# Patient Record
Sex: MALE | Race: WHITE | NOT HISPANIC OR LATINO | Employment: FULL TIME | URBAN - NONMETROPOLITAN AREA
[De-identification: names, ages, dates, MRNs, and addresses within clinical notes are randomized per-mention and may not be internally consistent; named-entity substitution may affect disease eponyms.]

---

## 2017-06-18 ENCOUNTER — HOSPITAL ENCOUNTER (OUTPATIENT)
Facility: HOSPITAL | Age: 67
Setting detail: OBSERVATION
Discharge: HOME | End: 2017-06-19
Attending: EMERGENCY MEDICINE | Admitting: FAMILY MEDICINE

## 2017-06-18 ENCOUNTER — APPOINTMENT (OUTPATIENT)
Dept: GENERAL RADIOLOGY | Facility: HOSPITAL | Age: 67
End: 2017-06-18

## 2017-06-18 DIAGNOSIS — I20.0 UNSTABLE ANGINA PECTORIS (HCC): Primary | ICD-10-CM

## 2017-06-18 PROBLEM — I20.9 ANGINA PECTORIS (HCC): Status: ACTIVE | Noted: 2017-06-18

## 2017-06-18 PROBLEM — G58.8: Status: ACTIVE | Noted: 2017-06-18

## 2017-06-18 PROBLEM — K50.90 CROHN'S DISEASE (HCC): Status: ACTIVE | Noted: 2017-06-18

## 2017-06-18 PROBLEM — R07.2 PRECORDIAL PAIN: Status: ACTIVE | Noted: 2017-06-18

## 2017-06-18 PROBLEM — I10 HYPERTENSION: Status: ACTIVE | Noted: 2017-06-18

## 2017-06-18 PROBLEM — E78.5 HYPERLIPIDEMIA: Status: ACTIVE | Noted: 2017-06-18

## 2017-06-18 LAB
ALBUMIN SERPL-MCNC: 4.5 G/DL (ref 3.5–5)
ALBUMIN/GLOB SERPL: 1.4 G/DL (ref 1.1–2.5)
ALP SERPL-CCNC: 94 U/L (ref 24–120)
ALT SERPL W P-5'-P-CCNC: 55 U/L (ref 0–54)
ANION GAP SERPL CALCULATED.3IONS-SCNC: 11 MMOL/L (ref 4–13)
AST SERPL-CCNC: 41 U/L (ref 7–45)
BASOPHILS # BLD AUTO: 0.05 10*3/MM3 (ref 0–0.2)
BASOPHILS NFR BLD AUTO: 0.5 % (ref 0–2)
BILIRUB SERPL-MCNC: 0.8 MG/DL (ref 0.1–1)
BUN BLD-MCNC: 13 MG/DL (ref 5–21)
BUN/CREAT SERPL: 12 (ref 7–25)
CALCIUM SPEC-SCNC: 9.6 MG/DL (ref 8.4–10.4)
CHLORIDE SERPL-SCNC: 103 MMOL/L (ref 98–110)
CO2 SERPL-SCNC: 28 MMOL/L (ref 24–31)
CREAT BLD-MCNC: 1.08 MG/DL (ref 0.5–1.4)
DEPRECATED RDW RBC AUTO: 45.4 FL (ref 40–54)
EOSINOPHIL # BLD AUTO: 0.47 10*3/MM3 (ref 0–0.7)
EOSINOPHIL NFR BLD AUTO: 5 % (ref 0–4)
ERYTHROCYTE [DISTWIDTH] IN BLOOD BY AUTOMATED COUNT: 13.4 % (ref 12–15)
GFR SERPL CREATININE-BSD FRML MDRD: 70 ML/MIN/1.73
GLOBULIN UR ELPH-MCNC: 3.3 GM/DL
GLUCOSE BLD-MCNC: 116 MG/DL (ref 70–100)
HCT VFR BLD AUTO: 43.3 % (ref 40–52)
HGB BLD-MCNC: 14.8 G/DL (ref 14–18)
HOLD SPECIMEN: NORMAL
HOLD SPECIMEN: NORMAL
IMM GRANULOCYTES # BLD: 0.02 10*3/MM3 (ref 0–0.03)
IMM GRANULOCYTES NFR BLD: 0.2 % (ref 0–5)
INR PPP: 0.93 (ref 0.91–1.09)
LYMPHOCYTES # BLD AUTO: 2.07 10*3/MM3 (ref 0.72–4.86)
LYMPHOCYTES NFR BLD AUTO: 21.9 % (ref 15–45)
MCH RBC QN AUTO: 31.8 PG (ref 28–32)
MCHC RBC AUTO-ENTMCNC: 34.2 G/DL (ref 33–36)
MCV RBC AUTO: 92.9 FL (ref 82–95)
MONOCYTES # BLD AUTO: 1 10*3/MM3 (ref 0.19–1.3)
MONOCYTES NFR BLD AUTO: 10.6 % (ref 4–12)
NEUTROPHILS # BLD AUTO: 5.84 10*3/MM3 (ref 1.87–8.4)
NEUTROPHILS NFR BLD AUTO: 61.8 % (ref 39–78)
PLATELET # BLD AUTO: 283 10*3/MM3 (ref 130–400)
PMV BLD AUTO: 10.8 FL (ref 6–12)
POTASSIUM BLD-SCNC: 3.9 MMOL/L (ref 3.5–5.3)
PROT SERPL-MCNC: 7.8 G/DL (ref 6.3–8.7)
PROTHROMBIN TIME: 12.7 SECONDS (ref 11.9–14.6)
RBC # BLD AUTO: 4.66 10*6/MM3 (ref 4.8–5.9)
SODIUM BLD-SCNC: 142 MMOL/L (ref 135–145)
TROPONIN I SERPL-MCNC: 0 NG/ML (ref 0–0.07)
TROPONIN I SERPL-MCNC: <0.012 NG/ML (ref 0–0.03)
TROPONIN I SERPL-MCNC: <0.012 NG/ML (ref 0–0.03)
WBC NRBC COR # BLD: 9.45 10*3/MM3 (ref 4.8–10.8)
WHOLE BLOOD HOLD SPECIMEN: NORMAL
WHOLE BLOOD HOLD SPECIMEN: NORMAL

## 2017-06-18 PROCEDURE — 80053 COMPREHEN METABOLIC PANEL: CPT | Performed by: EMERGENCY MEDICINE

## 2017-06-18 PROCEDURE — G0378 HOSPITAL OBSERVATION PER HR: HCPCS

## 2017-06-18 PROCEDURE — 85610 PROTHROMBIN TIME: CPT | Performed by: EMERGENCY MEDICINE

## 2017-06-18 PROCEDURE — 85025 COMPLETE CBC W/AUTO DIFF WBC: CPT | Performed by: EMERGENCY MEDICINE

## 2017-06-18 PROCEDURE — 99285 EMERGENCY DEPT VISIT HI MDM: CPT

## 2017-06-18 PROCEDURE — 84484 ASSAY OF TROPONIN QUANT: CPT | Performed by: FAMILY MEDICINE

## 2017-06-18 PROCEDURE — 93010 ELECTROCARDIOGRAM REPORT: CPT | Performed by: INTERNAL MEDICINE

## 2017-06-18 PROCEDURE — 71010 HC CHEST PA OR AP: CPT

## 2017-06-18 PROCEDURE — 93005 ELECTROCARDIOGRAM TRACING: CPT

## 2017-06-18 PROCEDURE — 84484 ASSAY OF TROPONIN QUANT: CPT

## 2017-06-18 RX ORDER — ASCORBIC ACID 500 MG
500 TABLET ORAL DAILY
Status: DISCONTINUED | OUTPATIENT
Start: 2017-06-18 | End: 2017-06-19

## 2017-06-18 RX ORDER — MELATONIN
1000 2 TIMES DAILY
COMMUNITY

## 2017-06-18 RX ORDER — MESALAMINE 400 MG/1
800 CAPSULE, DELAYED RELEASE ORAL EVERY 12 HOURS SCHEDULED
Status: DISCONTINUED | OUTPATIENT
Start: 2017-06-18 | End: 2017-06-19

## 2017-06-18 RX ORDER — SODIUM CHLORIDE 0.9 % (FLUSH) 0.9 %
10 SYRINGE (ML) INJECTION AS NEEDED
Status: DISCONTINUED | OUTPATIENT
Start: 2017-06-18 | End: 2017-06-19 | Stop reason: HOSPADM

## 2017-06-18 RX ORDER — MESALAMINE 400 MG/1
800 CAPSULE, DELAYED RELEASE ORAL EVERY 12 HOURS
COMMUNITY

## 2017-06-18 RX ORDER — ASPIRIN 81 MG/1
81 TABLET ORAL DAILY
Status: DISCONTINUED | OUTPATIENT
Start: 2017-06-19 | End: 2017-06-19 | Stop reason: HOSPADM

## 2017-06-18 RX ORDER — ACETAMINOPHEN 325 MG/1
650 TABLET ORAL EVERY 4 HOURS PRN
Status: DISCONTINUED | OUTPATIENT
Start: 2017-06-18 | End: 2017-06-19 | Stop reason: HOSPADM

## 2017-06-18 RX ORDER — ALPRAZOLAM 0.5 MG/1
1 TABLET ORAL 3 TIMES DAILY PRN
Status: DISCONTINUED | OUTPATIENT
Start: 2017-06-18 | End: 2017-06-19 | Stop reason: HOSPADM

## 2017-06-18 RX ORDER — MELATONIN
2000 DAILY
Status: DISCONTINUED | OUTPATIENT
Start: 2017-06-19 | End: 2017-06-19 | Stop reason: HOSPADM

## 2017-06-18 RX ORDER — HYDROCODONE BITARTRATE AND ACETAMINOPHEN 5; 325 MG/1; MG/1
1 TABLET ORAL EVERY 4 HOURS PRN
Status: DISCONTINUED | OUTPATIENT
Start: 2017-06-18 | End: 2017-06-19 | Stop reason: HOSPADM

## 2017-06-18 RX ORDER — SODIUM CHLORIDE 0.9 % (FLUSH) 0.9 %
1-10 SYRINGE (ML) INJECTION AS NEEDED
Status: DISCONTINUED | OUTPATIENT
Start: 2017-06-18 | End: 2017-06-19 | Stop reason: HOSPADM

## 2017-06-18 RX ORDER — BISACODYL 5 MG/1
5 TABLET, DELAYED RELEASE ORAL DAILY PRN
Status: DISCONTINUED | OUTPATIENT
Start: 2017-06-18 | End: 2017-06-19 | Stop reason: HOSPADM

## 2017-06-18 RX ORDER — METOPROLOL SUCCINATE 50 MG/1
50 TABLET, EXTENDED RELEASE ORAL DAILY
COMMUNITY

## 2017-06-18 RX ORDER — ATORVASTATIN CALCIUM 40 MG/1
40 TABLET, FILM COATED ORAL NIGHTLY
COMMUNITY

## 2017-06-18 RX ORDER — PANTOPRAZOLE SODIUM 40 MG/1
40 TABLET, DELAYED RELEASE ORAL EVERY MORNING
Status: DISCONTINUED | OUTPATIENT
Start: 2017-06-19 | End: 2017-06-19 | Stop reason: HOSPADM

## 2017-06-18 RX ORDER — MAGNESIUM HYDROXIDE/ALUMINUM HYDROXICE/SIMETHICONE 120; 1200; 1200 MG/30ML; MG/30ML; MG/30ML
30 SUSPENSION ORAL EVERY 6 HOURS PRN
Status: DISCONTINUED | OUTPATIENT
Start: 2017-06-18 | End: 2017-06-19 | Stop reason: HOSPADM

## 2017-06-18 RX ORDER — ASPIRIN 81 MG/1
324 TABLET, CHEWABLE ORAL ONCE
Status: COMPLETED | OUTPATIENT
Start: 2017-06-18 | End: 2017-06-18

## 2017-06-18 RX ORDER — ASCORBIC ACID 500 MG
500 TABLET ORAL NIGHTLY
COMMUNITY

## 2017-06-18 RX ORDER — ONDANSETRON 2 MG/ML
4 INJECTION INTRAMUSCULAR; INTRAVENOUS EVERY 6 HOURS PRN
Status: DISCONTINUED | OUTPATIENT
Start: 2017-06-18 | End: 2017-06-19 | Stop reason: HOSPADM

## 2017-06-18 RX ORDER — ATORVASTATIN CALCIUM 40 MG/1
40 TABLET, FILM COATED ORAL NIGHTLY
Status: DISCONTINUED | OUTPATIENT
Start: 2017-06-18 | End: 2017-06-19 | Stop reason: HOSPADM

## 2017-06-18 RX ORDER — ASPIRIN 81 MG/1
81 TABLET ORAL DAILY
COMMUNITY

## 2017-06-18 RX ORDER — ALPRAZOLAM 1 MG/1
1 TABLET ORAL 3 TIMES DAILY PRN
COMMUNITY

## 2017-06-18 RX ORDER — METOPROLOL SUCCINATE 50 MG/1
50 TABLET, EXTENDED RELEASE ORAL DAILY
Status: DISCONTINUED | OUTPATIENT
Start: 2017-06-19 | End: 2017-06-19 | Stop reason: HOSPADM

## 2017-06-18 RX ORDER — LANSOPRAZOLE 30 MG/1
30 CAPSULE, DELAYED RELEASE ORAL DAILY
COMMUNITY

## 2017-06-18 RX ADMIN — ASPIRIN 81 MG 243 MG: 81 TABLET ORAL at 17:18

## 2017-06-18 RX ADMIN — MESALAMINE 800 MG: 400 CAPSULE, DELAYED RELEASE ORAL at 20:55

## 2017-06-18 RX ADMIN — TICAGRELOR 90 MG: 90 TABLET ORAL at 20:55

## 2017-06-18 RX ADMIN — DESMOPRESSIN ACETATE 40 MG: 0.2 TABLET ORAL at 20:55

## 2017-06-18 RX ADMIN — OXYCODONE HYDROCHLORIDE AND ACETAMINOPHEN 500 MG: 500 TABLET ORAL at 20:55

## 2017-06-18 NOTE — ED PROVIDER NOTES
Subjective   Patient is a 60 y.o. male presenting with chest pain.   Chest Pain   Pain location:  Substernal area  Pain quality: aching and pressure    Pain radiates to:  L jaw  Pain severity:  Mild  Onset quality:  Gradual  Timing:  Constant  Progression:  Worsening  Chronicity:  New  Context: not breathing, not drug use, not eating, not lifting, not movement, not raising an arm, not at rest and not trauma    Relieved by:  Nothing  Worsened by:  Nothing  Associated symptoms: no abdominal pain, no AICD problem, no altered mental status, no back pain, no dizziness, no dysphagia, no fatigue, no headache, no nausea, no numbness, no orthopnea, no palpitations and no weakness        Review of Systems   Constitutional: Negative.  Negative for fatigue.   HENT: Negative.  Negative for trouble swallowing.    Cardiovascular: Positive for chest pain. Negative for palpitations and orthopnea.   Gastrointestinal: Negative.  Negative for abdominal distention, abdominal pain and nausea.   Endocrine: Negative.    Genitourinary: Negative.    Musculoskeletal: Negative.  Negative for back pain and neck pain.   Skin: Negative for color change and pallor.   Neurological: Negative.  Negative for dizziness, syncope, weakness, light-headedness, numbness and headaches.   Hematological: Negative.  Does not bruise/bleed easily.   All other systems reviewed and are negative.      Past Medical History:   Diagnosis Date   • Crohn's disease    • Disorder of phrenic nerve    • Hyperlipidemia    • Hypertension    • Myocardial infarction    • Panic attack        Allergies   Allergen Reactions   • Amoxicillin    • Ciprofloxacin    • Cozaar [Losartan Potassium]    • Levaquin [Levofloxacin]    • Penicillins        Past Surgical History:   Procedure Laterality Date   • ADENOIDECTOMY     • CORONARY ANGIOPLASTY WITH STENT PLACEMENT     • HERNIA REPAIR     • TONSILLECTOMY         History reviewed. No pertinent family history.    Social History     Social  History   • Marital status:      Spouse name: N/A   • Number of children: N/A   • Years of education: N/A     Social History Main Topics   • Smoking status: Never Smoker   • Smokeless tobacco: None   • Alcohol use No   • Drug use: No   • Sexual activity: Not Asked     Other Topics Concern   • None     Social History Narrative   • None           Objective   Physical Exam   Constitutional: He is oriented to person, place, and time. He appears well-developed.  Non-toxic appearance.   HENT:   Head: Normocephalic and atraumatic.   Mouth/Throat: Uvula is midline and mucous membranes are normal.   Eyes: Conjunctivae and lids are normal. Pupils are equal, round, and reactive to light. Lids are everted and swept, no foreign bodies found.   Neck: Trachea normal, normal range of motion, full passive range of motion without pain and phonation normal. Neck supple. Normal carotid pulses and no JVD present. Carotid bruit is not present. No rigidity. No tracheal deviation present.   Cardiovascular: Normal rate, regular rhythm, normal heart sounds, intact distal pulses and normal pulses.  PMI is not displaced.    Pulmonary/Chest: Effort normal and breath sounds normal. No accessory muscle usage or stridor. No apnea and no tachypnea. He has no decreased breath sounds. He has no wheezes. He has no rhonchi. He has no rales.   Abdominal: Soft. Normal appearance, normal aorta and bowel sounds are normal. There is no hepatosplenomegaly. There is no tenderness.   Musculoskeletal: Normal range of motion.   Lower extremity exam bilaterally is unremarkable.  There is no right or left calf tenderness .  There is no palpable venous cord.  No obvious difference in the size of the legs.  No pitting edema.  The dorsalis pedis and posterior tibial femoral and popliteal pulses are palpable and +2 bilaterally.  Homans sign is negative       Vascular Status -  His exam exhibits right foot vasculature normal. His exam exhibits no right foot  edema. His exam exhibits left foot vasculature normal. His exam exhibits no left foot edema.  Neurological: He is alert and oriented to person, place, and time. He has normal strength and normal reflexes. He displays normal reflexes. No cranial nerve deficit or sensory deficit. Gait normal. GCS eye subscore is 4. GCS verbal subscore is 5. GCS motor subscore is 6.   Skin: Skin is warm, dry and intact. No cyanosis. No pallor. Nails show no clubbing.   Psychiatric: He has a normal mood and affect. His speech is normal and behavior is normal.   Nursing note and vitals reviewed.      Procedures         ED Course  ED Course   Comment By Time   Patient given a chest pain EKG shows normal sinus rhythm; negative will be admitted to the hospitalist service for rule out protocol patient and the patient's family wants stay here Broderick Tinajero MD 06/18 5770            HEART Score  History: Moderately suspicious (+1)  ECG: Non specific repolarization disturbance (+1)  Age: 45 through 65 (+1)  Risk Factors: 1 - 2 risk factors (+1)  Troponin: Normal limit or lower (+0)  Total: 4         MDM    Final diagnoses:   Unstable angina pectoris            Broderick Tinajero MD  06/18/17 2045

## 2017-06-19 ENCOUNTER — APPOINTMENT (OUTPATIENT)
Dept: CARDIOLOGY | Facility: HOSPITAL | Age: 67
End: 2017-06-19
Attending: FAMILY MEDICINE

## 2017-06-19 VITALS
OXYGEN SATURATION: 98 % | DIASTOLIC BLOOD PRESSURE: 85 MMHG | RESPIRATION RATE: 18 BRPM | HEART RATE: 59 BPM | HEIGHT: 70 IN | BODY MASS INDEX: 34.3 KG/M2 | WEIGHT: 239.6 LBS | SYSTOLIC BLOOD PRESSURE: 127 MMHG | TEMPERATURE: 97.4 F

## 2017-06-19 LAB
ANION GAP SERPL CALCULATED.3IONS-SCNC: 11 MMOL/L (ref 4–13)
ARTICHOKE IGE QN: 45 MG/DL (ref 0–99)
BASOPHILS # BLD AUTO: 0.05 10*3/MM3 (ref 0–0.2)
BASOPHILS NFR BLD AUTO: 0.5 % (ref 0–2)
BH CV STRESS BP STAGE 1: NORMAL
BH CV STRESS BP STAGE 2: NORMAL
BH CV STRESS DURATION MIN STAGE 1: 3
BH CV STRESS DURATION MIN STAGE 2: 3
BH CV STRESS DURATION MIN STAGE 3: 0
BH CV STRESS DURATION SEC STAGE 1: 0
BH CV STRESS DURATION SEC STAGE 2: 0
BH CV STRESS DURATION SEC STAGE 3: 25
BH CV STRESS GRADE STAGE 1: 10
BH CV STRESS GRADE STAGE 2: 12
BH CV STRESS GRADE STAGE 3: 14
BH CV STRESS HR STAGE 1: 99
BH CV STRESS HR STAGE 2: 115
BH CV STRESS HR STAGE 3: 130
BH CV STRESS METS STAGE 1: 5
BH CV STRESS METS STAGE 2: 7.5
BH CV STRESS METS STAGE 3: 10
BH CV STRESS PROTOCOL 1: NORMAL
BH CV STRESS RECOVERY BP: NORMAL MMHG
BH CV STRESS RECOVERY HR: 76 BPM
BH CV STRESS SPEED STAGE 1: 1.7
BH CV STRESS SPEED STAGE 2: 2.5
BH CV STRESS SPEED STAGE 3: 3.4
BH CV STRESS STAGE 1: 1
BH CV STRESS STAGE 2: 2
BH CV STRESS STAGE 3: 3
BUN BLD-MCNC: 12 MG/DL (ref 5–21)
BUN/CREAT SERPL: 12.5 (ref 7–25)
CALCIUM SPEC-SCNC: 9.3 MG/DL (ref 8.4–10.4)
CHLORIDE SERPL-SCNC: 105 MMOL/L (ref 98–110)
CHOLEST SERPL-MCNC: 101 MG/DL (ref 130–200)
CO2 SERPL-SCNC: 27 MMOL/L (ref 24–31)
CREAT BLD-MCNC: 0.96 MG/DL (ref 0.5–1.4)
DEPRECATED RDW RBC AUTO: 45 FL (ref 40–54)
EOSINOPHIL # BLD AUTO: 0.54 10*3/MM3 (ref 0–0.7)
EOSINOPHIL NFR BLD AUTO: 5.4 % (ref 0–4)
ERYTHROCYTE [DISTWIDTH] IN BLOOD BY AUTOMATED COUNT: 13.3 % (ref 12–15)
GFR SERPL CREATININE-BSD FRML MDRD: 79 ML/MIN/1.73
GLUCOSE BLD-MCNC: 94 MG/DL (ref 70–100)
HCT VFR BLD AUTO: 41.7 % (ref 40–52)
HDLC SERPL-MCNC: 32 MG/DL
HGB BLD-MCNC: 14 G/DL (ref 14–18)
IMM GRANULOCYTES # BLD: 0.03 10*3/MM3 (ref 0–0.03)
IMM GRANULOCYTES NFR BLD: 0.3 % (ref 0–5)
LDLC/HDLC SERPL: 1.66 {RATIO}
LYMPHOCYTES # BLD AUTO: 2.6 10*3/MM3 (ref 0.72–4.86)
LYMPHOCYTES NFR BLD AUTO: 25.9 % (ref 15–45)
MAXIMAL PREDICTED HEART RATE: 156 BPM
MCH RBC QN AUTO: 31 PG (ref 28–32)
MCHC RBC AUTO-ENTMCNC: 33.6 G/DL (ref 33–36)
MCV RBC AUTO: 92.5 FL (ref 82–95)
MONOCYTES # BLD AUTO: 1.07 10*3/MM3 (ref 0.19–1.3)
MONOCYTES NFR BLD AUTO: 10.6 % (ref 4–12)
NEUTROPHILS # BLD AUTO: 5.76 10*3/MM3 (ref 1.87–8.4)
NEUTROPHILS NFR BLD AUTO: 57.3 % (ref 39–78)
PERCENT MAX PREDICTED HR: 83.33 %
PLATELET # BLD AUTO: 246 10*3/MM3 (ref 130–400)
PMV BLD AUTO: 10.1 FL (ref 6–12)
POTASSIUM BLD-SCNC: 3.9 MMOL/L (ref 3.5–5.3)
RBC # BLD AUTO: 4.51 10*6/MM3 (ref 4.8–5.9)
SODIUM BLD-SCNC: 143 MMOL/L (ref 135–145)
STRESS BASELINE BP: NORMAL MMHG
STRESS BASELINE HR: 58 BPM
STRESS PERCENT HR: 98 %
STRESS POST EXERCISE DUR MIN: 6 MIN
STRESS POST EXERCISE DUR SEC: 25 SEC
STRESS POST PEAK BP: NORMAL MMHG
STRESS POST PEAK HR: 130 BPM
STRESS TARGET HR: 133 BPM
TRIGL SERPL-MCNC: 79 MG/DL (ref 0–149)
WBC NRBC COR # BLD: 10.05 10*3/MM3 (ref 4.8–10.8)

## 2017-06-19 PROCEDURE — 93018 CV STRESS TEST I&R ONLY: CPT | Performed by: INTERNAL MEDICINE

## 2017-06-19 PROCEDURE — C8928 TTE W OR W/O FOL W/CON,STRES: HCPCS

## 2017-06-19 PROCEDURE — 80061 LIPID PANEL: CPT | Performed by: FAMILY MEDICINE

## 2017-06-19 PROCEDURE — 25010000002 PERFLUTREN (DEFINITY) 8.476 MG IN SODIUM CHLORIDE 10 ML INJECTION: Performed by: INTERNAL MEDICINE

## 2017-06-19 PROCEDURE — G0378 HOSPITAL OBSERVATION PER HR: HCPCS

## 2017-06-19 PROCEDURE — 93350 STRESS TTE ONLY: CPT | Performed by: INTERNAL MEDICINE

## 2017-06-19 PROCEDURE — 93017 CV STRESS TEST TRACING ONLY: CPT

## 2017-06-19 PROCEDURE — 80048 BASIC METABOLIC PNL TOTAL CA: CPT | Performed by: FAMILY MEDICINE

## 2017-06-19 PROCEDURE — 85025 COMPLETE CBC W/AUTO DIFF WBC: CPT | Performed by: FAMILY MEDICINE

## 2017-06-19 PROCEDURE — 99204 OFFICE O/P NEW MOD 45 MIN: CPT | Performed by: INTERNAL MEDICINE

## 2017-06-19 PROCEDURE — 93352 ADMIN ECG CONTRAST AGENT: CPT | Performed by: INTERNAL MEDICINE

## 2017-06-19 RX ORDER — ASCORBIC ACID 500 MG
500 TABLET ORAL DAILY
Status: DISCONTINUED | OUTPATIENT
Start: 2017-06-19 | End: 2017-06-19 | Stop reason: HOSPADM

## 2017-06-19 RX ORDER — MESALAMINE 400 MG/1
800 CAPSULE, DELAYED RELEASE ORAL 2 TIMES DAILY
Status: DISCONTINUED | OUTPATIENT
Start: 2017-06-19 | End: 2017-06-19 | Stop reason: HOSPADM

## 2017-06-19 RX ADMIN — PANTOPRAZOLE SODIUM 40 MG: 40 TABLET, DELAYED RELEASE ORAL at 05:27

## 2017-06-19 RX ADMIN — SODIUM CHLORIDE 10 ML: 9 INJECTION INTRAMUSCULAR; INTRAVENOUS; SUBCUTANEOUS at 07:59

## 2017-06-19 RX ADMIN — ALPRAZOLAM 0.5 MG: 0.5 TABLET ORAL at 08:47

## 2017-06-19 RX ADMIN — ASPIRIN 81 MG: 81 TABLET ORAL at 08:47

## 2017-06-19 RX ADMIN — MESALAMINE 800 MG: 400 CAPSULE, DELAYED RELEASE ORAL at 08:47

## 2017-06-19 RX ADMIN — VITAMIN D, TAB 1000IU (100/BT) 1000 UNITS: 25 TAB at 08:47

## 2017-06-19 RX ADMIN — METOPROLOL SUCCINATE 50 MG: 50 TABLET, FILM COATED, EXTENDED RELEASE ORAL at 08:47

## 2017-06-19 RX ADMIN — TICAGRELOR 90 MG: 90 TABLET ORAL at 08:47

## 2017-06-19 NOTE — PROGRESS NOTES
Discharge Planning Assessment  [facility]     Patient Name: Rubén Olivares  MRN: [id number]  Today's Date: 6/19/2017    Admit Date: 6/18/2017          Discharge Needs Assessment       06/19/17 1030    Living Environment    Lives With spouse    Type of Financial/Environmental Concern none    Transportation Available family or friend will provide    Living Environment    Provides Primary Care For no one    Able to Return to Prior Living Arrangements yes    Discharge Needs Assessment    Concerns To Be Addressed no discharge needs identified;denies needs/concerns at this time    Equipment Currently Used at Home none    Equipment Needed After Discharge none    Discharge Disposition home or self-care            Discharge Plan       06/19/17 1031    Case Management/Social Work Plan    Plan PT resides at home with spouse and is independent. PT is not homebound and does not meet criteria for HH. PT plans to dc home with no anticipated dc needs. Will follow.     Patient/Family In Agreement With Plan yes        Discharge Placement     No information found                Demographic Summary     None            Functional Status     None            Psychosocial     None            Abuse/Neglect     None            Legal     None            Substance Abuse     None            Patient Forms     None          KRYSTAL Samayoa

## 2017-06-19 NOTE — CONSULTS
[facility] CONSULT NOTE    Referring Provider: Charlie Hernandez DO    Reason for Consultation: chest pain    Chief Complaint   Patient presents with   • Chest Pain       Subjective .     History of present illness:  Rubén Olivares is a 66 y.o. male with history of coronary artery disease, hypertension, hyperlipidemia, chron's disease, spinal stenosis who presented to [facility] ED on 6/18/17 with complaints of arm/jaw pain. The patient had a stent placed in September in [facility]. He relates he has had repeated episodes of arm pain, back pain that he has been evaluated in the ER since this time to be sure it wasn't his heart.   He states yesterday he was at [employer] and started having back pain. He tried to stretch and continued with pain. He then developed arm and jaw pain. At this point he decided to come to our facility. He denies any associated dyspnea, nausea, vomiting or diaphoresis. He did not take nitroglycerin or related for relief. He says this spontaneously abated in the emergency dept. He denies any recent exertional chest pain and dyspnea; however, he relates he doesn't exert himself much. He relates his myocardial infarction in September, his symptoms differed some; he relates more significant back pain during that episode. Troponin negative x 3, electrocardiogram with no significant STT changes. The patient underwent stress echo testing this am and denies any reproducible pain, chest pain or dyspnea.    History  Past Medical History:   Diagnosis Date   • Ankylosing spondylitis    • Arthritis    • Coronary artery disease    • Crohn's disease    • Disorder of phrenic nerve    • Hyperlipidemia    • Hypertension    • Myocardial infarction    • Panic attack    ,   Past Surgical History:   Procedure Laterality Date   • ADENOIDECTOMY     • CARDIAC CATHETERIZATION     • CORONARY ANGIOPLASTY WITH STENT PLACEMENT     • EYE SURGERY      iridotomy   • HERNIA REPAIR      x2   •  TONSILLECTOMY     ,   Family History   Problem Relation Age of Onset   • Cancer Mother    • Diabetes Mother    • Cancer Father    • Cancer Sister    • No Known Problems Brother    • No Known Problems Brother    • No Known Problems Brother    • No Known Problems Brother    • No Known Problems Brother    • Clotting disorder Sister    ,   Social History   Substance Use Topics   • Smoking status: Never Smoker   • Smokeless tobacco: None   • Alcohol use No   ,     Medications  Current Facility-Administered Medications   Medication Dose Route Frequency Provider Last Rate Last Dose   • acetaminophen (TYLENOL) tablet 650 mg  650 mg Oral Q4H PRN Charlie Hernandez, DO       • ALPRAZolam (XANAX) tablet 1 mg  1 mg Oral TID PRN Charlie Hernandez, DO   0.5 mg at 06/19/17 0847   • aluminum-magnesium hydroxide-simethicone (MAALOX/MYLANTA) suspension 30 mL  30 mL Oral Q6H PRN Charlie Hernandez, DO       • aspirin EC tablet 81 mg  81 mg Oral Daily Charlie Hernandez, DO   81 mg at 06/19/17 0847   • atorvastatin (LIPITOR) tablet 40 mg  40 mg Oral Nightly Charlie Hernandez, DO   40 mg at 06/18/17 2055   • bisacodyl (DULCOLAX) EC tablet 5 mg  5 mg Oral Daily PRN Charlie Hernandez DO       • cholecalciferol (VITAMIN D3) tablet 2,000 Units  2,000 Units Oral Daily Charlie Hernandez, DO   1,000 Units at 06/19/17 0847   • HYDROcodone-acetaminophen (NORCO) 5-325 MG per tablet 1 tablet  1 tablet Oral Q4H PRN Charlie Hernandez DO       • mesalamine (DELZICOL) delayed release capsule 800 mg  800 mg Oral BID Charlie Hernandez DO       • metoprolol succinate XL (TOPROL-XL) 24 hr tablet 50 mg  50 mg Oral Daily Charlie Hernandez, DO   50 mg at 06/19/17 0847   • ondansetron (ZOFRAN) injection 4 mg  4 mg Intravenous Q6H PRN Charlie Hernandez DO       • pantoprazole (PROTONIX) EC tablet 40 mg  40 mg Oral QAM Charlie Hernandez, DO   40 mg at 06/19/17 0527   • sodium chloride 0.9 % flush 1-10 mL  1-10 mL Intravenous PRN Charlie Hernandez,  "DO       • sodium chloride 0.9 % flush 10 mL  10 mL Intravenous PRN Broderick Tinajero MD       • ticagrelor (BRILINTA) tablet 90 mg  90 mg Oral Q12H Charlie Hernandez DO   90 mg at 06/19/17 0847   • vitamin C (ASCORBIC ACID) tablet 500 mg  500 mg Oral Daily Charlie Hernandez DO           Allergies:  Amoxicillin; Ciprofloxacin; Cozaar [losartan potassium]; Levaquin [levofloxacin]; and Penicillins    Review of Systems  Review of Systems   Constitution: Negative for malaise/fatigue and weight gain.   Cardiovascular: Negative for chest pain, claudication, dyspnea on exertion, irregular heartbeat, leg swelling, near-syncope, orthopnea, palpitations, paroxysmal nocturnal dyspnea and syncope.   Respiratory: Negative for hemoptysis and shortness of breath.    Hematologic/Lymphatic: Negative for bleeding problem.   Skin: Negative for poor wound healing.   Musculoskeletal: Positive for back pain and neck pain. Negative for myalgias.   Gastrointestinal: Negative for melena, nausea and vomiting.   Genitourinary: Negative for hematuria.   Neurological: Negative for focal weakness and light-headedness.   Psychiatric/Behavioral: Negative for memory loss.   All other systems reviewed and are negative.      Objective     Physical Exam:  Patient Vitals for the past 24 hrs:   BP Temp Temp src Pulse Resp SpO2 Height Weight   06/19/17 0800 127/85 - - 59 - - - -   06/19/17 0719 129/74 97.4 °F (36.3 °C) Temporal Art 57 18 98 % - -   06/18/17 2037 136/72 97.7 °F (36.5 °C) Temporal Art 53 18 96 % - -   06/18/17 1845 136/79 98.2 °F (36.8 °C) Temporal Art 60 20 97 % 70\" (177.8 cm) 239 lb 9.6 oz (109 kg)   06/18/17 1801 129/72 98.4 °F (36.9 °C) Oral 60 - 95 % - -   06/18/17 1716 123/72 - - 63 - 95 % - -   06/18/17 1701 150/87 - - 70 - 93 % - -   06/18/17 1647 168/74 - - 68 - 98 % - -   06/18/17 1643 180/75 - - 70 - 96 % - -   06/18/17 1553 158/70 98.2 °F (36.8 °C) Temporal Art 77 12 97 % 71\" (180.3 cm) 236 lb (107 kg)     Physical Exam "   Constitutional: He is oriented to person, place, and time. He appears well-developed and well-nourished.   HENT:   Head: Normocephalic and atraumatic.   Eyes: Conjunctivae and EOM are normal. Pupils are equal, round, and reactive to light.   Neck: Normal range of motion. Neck supple. No JVD present.   Cardiovascular: Normal rate, regular rhythm, S1 normal, S2 normal, normal heart sounds, intact distal pulses and normal pulses.    No murmur heard.  Pulmonary/Chest: Effort normal and breath sounds normal. No respiratory distress.   Abdominal: Soft. Bowel sounds are normal. He exhibits no distension.   Musculoskeletal: He exhibits no edema.   Neurological: He is alert and oriented to person, place, and time.   Skin: Skin is warm and dry.   Psychiatric: He has a normal mood and affect. Judgment normal.   Vitals reviewed.      Results Review:   I reviewed the patient's new clinical results.    Lab Results (last 72 hours)     Procedure Component Value Units Date/Time    POC Troponin, Rapid [[id number]]  (Normal) Collected:  06/18/17 1615    Specimen:  Blood Updated:  06/18/17 1630     Troponin I 0.00 ng/mL       Serial Number: [id number]    : [id number]       Comprehensive Metabolic Panel [[id number]]  (Abnormal) Collected:  06/18/17 1606    Specimen:  Blood Updated:  06/18/17 1702     Glucose 116 (H) mg/dL      BUN 13 mg/dL      Creatinine 1.08 mg/dL      Sodium 142 mmol/L      Potassium 3.9 mmol/L      Chloride 103 mmol/L      CO2 28.0 mmol/L      Calcium 9.6 mg/dL      Total Protein 7.8 g/dL      Albumin 4.50 g/dL      ALT (SGPT) 55 (H) U/L      AST (SGOT) 41 U/L      Alkaline Phosphatase 94 U/L      Total Bilirubin 0.8 mg/dL      eGFR Non African Amer 70 mL/min/1.73      Globulin 3.3 gm/dL      A/G Ratio 1.4 g/dL      BUN/Creatinine Ratio 12.0     Anion Gap 11.0 mmol/L     CBC & Differential [[id number]] Collected:  06/18/17 1606    Specimen:  Blood Updated:  06/18/17 1711    Narrative:       The following orders  were created for panel order CBC & Differential.  Procedure                               Abnormality         Status                     ---------                               -----------         ------                     CBC Auto Differential[[id number]]        Abnormal            Final result                 Please view results for these tests on the individual orders.    CBC Auto Differential [[id number]]  (Abnormal) Collected:  06/18/17 1606    Specimen:  Blood Updated:  06/18/17 1711     WBC 9.45 10*3/mm3      RBC 4.66 (L) 10*6/mm3      Hemoglobin 14.8 g/dL      Hematocrit 43.3 %      MCV 92.9 fL      MCH 31.8 pg      MCHC 34.2 g/dL      RDW 13.4 %      RDW-SD 45.4 fl      MPV 10.8 fL      Platelets 283 10*3/mm3      Neutrophil % 61.8 %      Lymphocyte % 21.9 %      Monocyte % 10.6 %      Eosinophil % 5.0 (H) %      Basophil % 0.5 %      Immature Grans % 0.2 %      Neutrophils, Absolute 5.84 10*3/mm3      Lymphocytes, Absolute 2.07 10*3/mm3      Monocytes, Absolute 1.00 10*3/mm3      Eosinophils, Absolute 0.47 10*3/mm3      Basophils, Absolute 0.05 10*3/mm3      Immature Grans, Absolute 0.02 10*3/mm3     Protime-INR [[id number]]  (Normal) Collected:  06/18/17 1605    Specimen:  Blood from Arm, Left Updated:  06/18/17 1715     Protime 12.7 Seconds      INR 0.93    Light Blue Top [[id number]] Collected:  06/18/17 1605    Specimen:  Blood from Arm, Left Updated:  06/18/17 1801     Extra Tube hold for add-on      Auto resulted       Green Top (Gel) [[id number]] Collected:  06/18/17 1606    Specimen:  Blood Updated:  06/18/17 1801     Extra Tube Hold for add-ons.      Auto resulted.       Lavender Top [[id number]] Collected:  06/18/17 1606    Specimen:  Blood Updated:  06/18/17 1801     Extra Tube hold for add-on      Auto resulted       Red Top [[id number]] Collected:  06/18/17 1606    Specimen:  Blood from Arm, Left Updated:  06/18/17 1801     Extra Tube Hold for add-ons.      Auto resulted.       Troponin [[id number]]   (Normal) Collected:  06/18/17 1956    Specimen:  Blood Updated:  06/18/17 2031     Troponin I <0.012 ng/mL     [place] Draw [[id number]] Collected:  06/18/17 1605    Specimen:  Blood Updated:  06/18/17 2122    Narrative:       The following orders were created for panel order [place] [facility].  Procedure                               Abnormality         Status                     ---------                               -----------         ------                     Light Blue Top[[id number]]                                   Final result               Green Top (Gel)[[id number]]                                  Final result               Lavender Top[[id number]]                                     Final result               Red Top[[id number]]                                          Final result               Green Top (No Gel)[[id number]]                                                            Please view results for these tests on the individual orders.    Troponin [[id number]]  (Normal) Collected:  06/18/17 2220    Specimen:  Blood Updated:  06/18/17 2303     Troponin I <0.012 ng/mL     CBC Auto Differential [[id number]]  (Abnormal) Collected:  06/19/17 0251    Specimen:  Blood Updated:  06/19/17 0309     WBC 10.05 10*3/mm3      RBC 4.51 (L) 10*6/mm3      Hemoglobin 14.0 g/dL      Hematocrit 41.7 %      MCV 92.5 fL      MCH 31.0 pg      MCHC 33.6 g/dL      RDW 13.3 %      RDW-SD 45.0 fl      MPV 10.1 fL      Platelets 246 10*3/mm3      Neutrophil % 57.3 %      Lymphocyte % 25.9 %      Monocyte % 10.6 %      Eosinophil % 5.4 (H) %      Basophil % 0.5 %      Immature Grans % 0.3 %      Neutrophils, Absolute 5.76 10*3/mm3      Lymphocytes, Absolute 2.60 10*3/mm3      Monocytes, Absolute 1.07 10*3/mm3      Eosinophils, Absolute 0.54 10*3/mm3      Basophils, Absolute 0.05 10*3/mm3      Immature Grans, Absolute 0.03 10*3/mm3     Basic Metabolic Panel [[id number]]  (Normal) Collected:  06/19/17 0251    Specimen:  Blood Updated:   06/19/17 0335     Glucose 94 mg/dL      BUN 12 mg/dL      Creatinine 0.96 mg/dL      Sodium 143 mmol/L      Potassium 3.9 mmol/L      Chloride 105 mmol/L      CO2 27.0 mmol/L      Calcium 9.3 mg/dL      eGFR Non African Amer 79 mL/min/1.73      BUN/Creatinine Ratio 12.5     Anion Gap 11.0 mmol/L     Narrative:       GFR Normal >60  Chronic Kidney Disease <60  Kidney Failure <15    Lipid Panel [[id number]]  (Abnormal) Collected:  06/19/17 0251    Specimen:  Blood Updated:  06/19/17 0346     Total Cholesterol 101 (L) mg/dL      Triglycerides 79 mg/dL      HDL Cholesterol 32 (L) mg/dL      LDL Cholesterol  45 mg/dL      LDL/HDL Ratio 1.66          No results found for: ECHOEFEST    Imaging Results (last 72 hours)     Procedure Component Value Units Date/Time    XR Chest 1 View [[id number]] Collected:  06/18/17 1808     Updated:  06/18/17 2133    Narrative:       HISTORY: Chest pain     CXR: A frontal view of the chest is obtained.     There is no pulmonary consolidation or edema. There are calcified left  hilar lymph nodes. The cardiomediastinal contours are normal. There is  no pleural effusion or pneumothorax. There is no acute bony pathology.       Impression:       1. No acute cardiopulmonary process.  This report was finalized on 06/18/2017 21:29 by Dr. Sophie Reed MD.            Assessment   1. Arm/jaw pain  2. Coronary artery disease: history of stent placement in Sept in [facility].   3. Hypertension  4. Hyperlipidemia: on statin, LDL 45  5. Chron's disease  6. Spinal stenosis     Plan   1. Stress echo pending  2. Continue aspirin, brilinta, statin  3. Monitor telemetry  4. If stress test negative, likely ok for discharge  5. Awaiting records from [facility]    Further orders per Dr. Back upon his evaluation of the patient.     Thank you for the consultation, cardiology will gladly continue to follow.     Dianna Bermudez PA-C        Please note this cardiology consultation note is the result of a face to face  consultation with the patient, in addition to reviewing medical records at length by myself, Dianna Bermudez PA-C.    Time: appx 35 minutes

## 2017-06-19 NOTE — PLAN OF CARE
Problem: Patient Care Overview (Adult)  Goal: Plan of Care Review  Outcome: Ongoing (interventions implemented as appropriate)    06/19/17 0344   Coping/Psychosocial Response Interventions   Plan Of Care Reviewed With patient;spouse   Patient Care Overview   Progress improving   Outcome Evaluation   Outcome Summary/Follow up Plan No complaints of pain. VSS BP WNL. SB-SR 51-73 as low as 46 with occas. PVC on tele. NPO p MN for stress echo today and cardiology consult. AM labs remarkable. Continue to monitor.        Goal: Adult Individualization and Mutuality  Outcome: Ongoing (interventions implemented as appropriate)  Goal: Discharge Needs Assessment  Outcome: Ongoing (interventions implemented as appropriate)    Problem: Acute Coronary Syndrome (ACS) (Adult)  Goal: Signs and Symptoms of Listed Potential Problems Will be Absent or Manageable (Acute Coronary Syndrome)  Outcome: Ongoing (interventions implemented as appropriate)

## 2017-06-19 NOTE — H&P
[facility] Medicine Services  HISTORY AND PHYSICAL    Date of Admission: 6/18/2017  Primary Care Physician: No Ortega PA-C    Subjective     Chief Complaint:   Chest pain, central back pain    History of Present Illness  This 66-year-old white male is admitted with a chief complaint of chest pain with associated back pain, jaw pain and left arm pain.  The patient states that his chest discomfort began at noon today.  The patient also notes that he had identical pain in his chest, back, jaw and arm when he had an MI 9 months ago.  The patient's chest pain abated out treatment while he was in the emergency room.  The patient did not receive nitroglycerin but did receive aspirin while in the emergency room.        Review of Systems   Constitutional: Negative.    HENT: Negative.    Eyes: Negative.    Respiratory: Negative.    Cardiovascular: Positive for chest pain (with radiation to central back, arm, and jaw).   Gastrointestinal: Negative.    Endocrine: Negative.    Genitourinary: Negative.    Musculoskeletal: Positive for back pain.   Skin: Negative.    Allergic/Immunologic: Negative.    Neurological: Negative.    Hematological: Negative.    Psychiatric/Behavioral: Negative.         Otherwise complete ROS reviewed and negative except as mentioned in the HPI.      Past Medical History:     Past Medical History:   Diagnosis Date   • Ankylosing spondylitis    • Crohn's disease    • Disorder of phrenic nerve    • Hyperlipidemia    • Hypertension    • Myocardial infarction    • Panic attack        Past Surgical History:  Past Surgical History:   Procedure Laterality Date   • ADENOIDECTOMY     • CORONARY ANGIOPLASTY WITH STENT PLACEMENT     • HERNIA REPAIR     • TONSILLECTOMY         Family History:   family history includes Cancer in his father, mother, and sister; Clotting disorder in his sister; Diabetes in his mother; No Known Problems in his brother, brother, brother,  brother, and brother.    Social History:    reports that he has never smoked. He does not have any smokeless tobacco history on file. He reports that he does not drink alcohol or use illicit drugs.    Medications:  Prior to Admission medications    Medication Sig Start Date End Date Taking? Authorizing Provider   ALPRAZolam (XANAX) 1 MG tablet Take 1 mg by mouth 3 (Three) Times a Day As Needed for Anxiety.   Yes Historical Provider, MD   aspirin 81 MG EC tablet Take 81 mg by mouth Daily.   Yes Historical Provider, MD   atorvastatin (LIPITOR) 40 MG tablet Take 40 mg by mouth Every Night.   Yes Historical Provider, MD   cholecalciferol (VITAMIN D3) 1000 UNITS tablet Take 2,000 Units by mouth Daily.   Yes Historical Provider, MD   lansoprazole (PREVACID) 30 MG capsule Take 30 mg by mouth Daily.   Yes Historical Provider, MD   mesalamine (DELZICOL) 400 MG capsule delayed-release delayed release capsule Take 800 mg by mouth Every 12 (Twelve) Hours.   Yes Historical Provider, MD   metoprolol succinate XL (TOPROL-XL) 50 MG 24 hr tablet Take 50 mg by mouth Daily.   Yes Historical Provider, MD   Multiple Vitamins-Minerals (MULTIVITAMIN ADULT PO) Take 1 tablet by mouth Daily.   Yes Historical Provider, MD   ticagrelor (BRILINTA) 90 MG tablet tablet Take 90 mg by mouth Every 12 (Twelve) Hours.   Yes Historical Provider, MD   vitamin C (ASCORBIC ACID) 500 MG tablet Take 500 mg by mouth Daily.   Yes Historical Provider, MD   Ascorbic Acid (VITAMIN C ER PO) Take  by mouth Daily.  6/18/17 Yes Historical Provider, MD   ATORVASTATIN CALCIUM PO Take  by mouth Every Night.  6/18/17 Yes Historical Provider, MD   Lansoprazole (PREVACID PO) Take  by mouth Daily.  6/18/17 Yes Historical Provider, MD   METOPROLOL SUCCINATE ER PO Take  by mouth Daily.  6/18/17 Yes Historical Provider, MD   Ticagrelor (BRILINTA PO) Take  by mouth.  6/18/17 Yes Historical Provider, MD   VITAMIN D, CHOLECALCIFEROL, PO Take  by mouth Daily.  6/18/17 Yes  "Historical Provider, MD       Allergies:  Allergies   Allergen Reactions   • Amoxicillin    • Ciprofloxacin    • Cozaar [Losartan Potassium]    • Levaquin [Levofloxacin]    • Penicillins        Objective     Vital Signs:   /79 (BP Location: Right arm, Patient Position: Sitting)  Pulse 60  Temp 98.2 °F (36.8 °C) (Temporal Artery )   Resp 20  Ht 70\" (177.8 cm)  Wt 239 lb 9.6 oz (109 kg)  SpO2 97%  BMI 34.38 kg/m2    Physical Exam   Constitutional: He is oriented to person, place, and time. He appears well-developed and well-nourished. No distress.   HENT:   Head: Normocephalic and atraumatic.   Right Ear: External ear normal.   Left Ear: External ear normal.   Nose: Nose normal.   Mouth/Throat: Oropharynx is clear and moist.   Eyes: Conjunctivae and EOM are normal. Pupils are equal, round, and reactive to light. No scleral icterus.   Neck: Normal range of motion. Neck supple. No JVD present. No tracheal deviation present. No thyromegaly present.   Cardiovascular: Normal rate, regular rhythm, normal heart sounds and intact distal pulses.    No murmur heard.  Pulmonary/Chest: Effort normal and breath sounds normal. No respiratory distress. He has no wheezes.   Abdominal: Soft. Bowel sounds are normal. He exhibits no distension. There is no tenderness.   Musculoskeletal: Normal range of motion. He exhibits tenderness (in the paraspinal area of the thoracic spine). He exhibits no edema.   Neurological: He is alert and oriented to person, place, and time. He has normal reflexes. No cranial nerve deficit. Coordination normal.   Skin: Skin is warm and dry. No rash noted. No erythema.   Psychiatric: He has a normal mood and affect. His behavior is normal. Judgment and thought content normal.           Results Reviewed:  Lab Results (last 24 hours)     Procedure Component Value Units Date/Time    POC Troponin, Rapid [[id number]]  (Normal) Collected:  06/18/17 1615    Specimen:  Blood Updated:  06/18/17 1630     " Troponin I 0.00 ng/mL       Serial Number: [id number]    : [id number]       Comprehensive Metabolic Panel [[id number]]  (Abnormal) Collected:  06/18/17 1606    Specimen:  Blood Updated:  06/18/17 1702     Glucose 116 (H) mg/dL      BUN 13 mg/dL      Creatinine 1.08 mg/dL      Sodium 142 mmol/L      Potassium 3.9 mmol/L      Chloride 103 mmol/L      CO2 28.0 mmol/L      Calcium 9.6 mg/dL      Total Protein 7.8 g/dL      Albumin 4.50 g/dL      ALT (SGPT) 55 (H) U/L      AST (SGOT) 41 U/L      Alkaline Phosphatase 94 U/L      Total Bilirubin 0.8 mg/dL      eGFR Non African Amer 70 mL/min/1.73      Globulin 3.3 gm/dL      A/G Ratio 1.4 g/dL      BUN/Creatinine Ratio 12.0     Anion Gap 11.0 mmol/L     CBC Auto Differential [[id number]]  (Abnormal) Collected:  06/18/17 1606    Specimen:  Blood Updated:  06/18/17 1711     WBC 9.45 10*3/mm3      RBC 4.66 (L) 10*6/mm3      Hemoglobin 14.8 g/dL      Hematocrit 43.3 %      MCV 92.9 fL      MCH 31.8 pg      MCHC 34.2 g/dL      RDW 13.4 %      RDW-SD 45.4 fl      MPV 10.8 fL      Platelets 283 10*3/mm3      Neutrophil % 61.8 %      Lymphocyte % 21.9 %      Monocyte % 10.6 %      Eosinophil % 5.0 (H) %      Basophil % 0.5 %      Immature Grans % 0.2 %      Neutrophils, Absolute 5.84 10*3/mm3      Lymphocytes, Absolute 2.07 10*3/mm3      Monocytes, Absolute 1.00 10*3/mm3      Eosinophils, Absolute 0.47 10*3/mm3      Basophils, Absolute 0.05 10*3/mm3      Immature Grans, Absolute 0.02 10*3/mm3     Protime-INR [[id number]]  (Normal) Collected:  06/18/17 1605    Specimen:  Blood from Arm, Left Updated:  06/18/17 1715     Protime 12.7 Seconds      INR 0.93       Xr Chest 1 View    Result Date: 6/18/2017  Narrative: HISTORY: Chest pain  CXR: A frontal view of the chest is obtained.  There is no pulmonary consolidation or edema. There are calcified left hilar lymph nodes. The cardiomediastinal contours are normal. There is no pleural effusion or pneumothorax. There is no acute bony  pathology.      Impression: 1. No acute cardiopulmonary process. This report was finalized on  by Dr. Sophie Reed MD.     I have personally reviewed and interpreted the radiology studies and ECG obtained at time of admission.     Assessment / Plan      Assessment & Plan  Hospital Problem List     * (Principal)Angina pectoris    Precordial pain    Hypertension    Hyperlipidemia    Disorder of phrenic nerve    Crohn's disease          PLAN:   Admit to the cardiac unit  Cardiac monitoring  Serial troponins  Cardiology consultation  Stress echo tomorrow      Code Status:   Full code  Surrogate decision-maker is the patient's wife     I discussed the patients findings and my recommendations with: Patient, his wife and son    Estimated length of stay: One day    Charlie Hernandez DO   06/18/17   7:10 PM

## 2017-06-19 NOTE — DISCHARGE SUMMARY
[facility] Medicine Services  DISCHARGE SUMMARY       Date of Admission: 6/18/2017  Date of Discharge:  6/19/2017  Primary Care Physician: No Ortega PA-C    Discharge Diagnoses:  Patient Active Problem List   Diagnosis   • Hypertension   • Hyperlipidemia   • Disorder of phrenic nerve   • Crohn's disease   • Precordial pain   • Angina pectoris         Presenting Problem/History of Present Illness:  Unstable angina pectoris [I20.0]     Chief Complaint on Day of Discharge:   None    History of Present Illness on Day of Discharge:   The patient has completed his stress test and it was interpreted as low risk of ischemia.  Serial troponins were negative.    Hospital Course   This 66-year-old white male is admitted with a chief complaint of chest pain with associated back pain, jaw pain and left arm pain. The patient states that his chest discomfort began at noon today. The patient also notes that he had identical pain in his chest, back, jaw and arm when he had an MI 9 months ago. The patient's chest pain abated out treatment while he was in the emergency room. The patient did not receive nitroglycerin but did receive aspirin while in the emergency room.   PLAN:   Admit to the cardiac unit  Cardiac monitoring  Serial troponins  Cardiology consultation  Stress echo tomorrow    The patient has had no further discomfort since admission and serial troponins are negative.  Stress echo shows no evidence for myocardial ischemia.  Cardiology has evaluated the patient and agrees that he is appropriate for discharge with follow-up to his family physician and personal cardiologist in [facility].    Procedures Performed:   Stress echo  Interpretation Summary   · Normal baseline ECG noted.  · The patient reported shortness of breath during the stress test. The patient experienced no angina during the stress test.  · There was no ST segment deviation noted during stress.  · Segment  augmentation had a normal response to stress.  · Normal stress echo with no significant echocardiographic evidence for myocardial ischemia.     Consults:   Cardiology    Pertinent Test Results:   Lab Results (last 7 days)     Procedure Component Value Units Date/Time    POC Troponin, Rapid [[id number]]  (Normal) Collected:  06/18/17 1615    Specimen:  Blood Updated:  06/18/17 1630     Troponin I 0.00 ng/mL       Serial Number: [id number]    : [id number]       Comprehensive Metabolic Panel [[id number]]  (Abnormal) Collected:  06/18/17 1606    Specimen:  Blood Updated:  06/18/17 1702     Glucose 116 (H) mg/dL      BUN 13 mg/dL      Creatinine 1.08 mg/dL      Sodium 142 mmol/L      Potassium 3.9 mmol/L      Chloride 103 mmol/L      CO2 28.0 mmol/L      Calcium 9.6 mg/dL      Total Protein 7.8 g/dL      Albumin 4.50 g/dL      ALT (SGPT) 55 (H) U/L      AST (SGOT) 41 U/L      Alkaline Phosphatase 94 U/L      Total Bilirubin 0.8 mg/dL      eGFR Non African Amer 70 mL/min/1.73      Globulin 3.3 gm/dL      A/G Ratio 1.4 g/dL      BUN/Creatinine Ratio 12.0     Anion Gap 11.0 mmol/L     CBC Auto Differential [[id number]]  (Abnormal) Collected:  06/18/17 1606    Specimen:  Blood Updated:  06/18/17 1711     WBC 9.45 10*3/mm3      RBC 4.66 (L) 10*6/mm3      Hemoglobin 14.8 g/dL      Hematocrit 43.3 %      MCV 92.9 fL      MCH 31.8 pg      MCHC 34.2 g/dL      RDW 13.4 %      RDW-SD 45.4 fl      MPV 10.8 fL      Platelets 283 10*3/mm3      Neutrophil % 61.8 %      Lymphocyte % 21.9 %      Monocyte % 10.6 %      Eosinophil % 5.0 (H) %      Basophil % 0.5 %      Immature Grans % 0.2 %      Neutrophils, Absolute 5.84 10*3/mm3      Lymphocytes, Absolute 2.07 10*3/mm3      Monocytes, Absolute 1.00 10*3/mm3      Eosinophils, Absolute 0.47 10*3/mm3      Basophils, Absolute 0.05 10*3/mm3      Immature Grans, Absolute 0.02 10*3/mm3     Protime-INR [[id number]]  (Normal) Collected:  06/18/17 2308    Specimen:  Blood from Arm, Left Updated:   "06/18/17 1715     Protime 12.7 Seconds      INR 0.93    Troponin [[id number]]  (Normal) Collected:  06/18/17 1956    Specimen:  Blood Updated:  06/18/17 2031     Troponin I <0.012 ng/mL     Troponin [[id number]]  (Normal) Collected:  06/18/17 2220    Specimen:  Blood Updated:  06/18/17 2303     Troponin I <0.012 ng/mL     CBC Auto Differential [[id number]]  (Abnormal) Collected:  06/19/17 0251    Specimen:  Blood Updated:  06/19/17 0309     WBC 10.05 10*3/mm3      RBC 4.51 (L) 10*6/mm3      Hemoglobin 14.0 g/dL      Hematocrit 41.7 %      MCV 92.5 fL      MCH 31.0 pg      MCHC 33.6 g/dL      RDW 13.3 %      RDW-SD 45.0 fl      MPV 10.1 fL      Platelets 246 10*3/mm3      Neutrophil % 57.3 %      Lymphocyte % 25.9 %      Monocyte % 10.6 %      Eosinophil % 5.4 (H) %      Basophil % 0.5 %      Immature Grans % 0.3 %      Neutrophils, Absolute 5.76 10*3/mm3      Lymphocytes, Absolute 2.60 10*3/mm3      Monocytes, Absolute 1.07 10*3/mm3      Eosinophils, Absolute 0.54 10*3/mm3      Basophils, Absolute 0.05 10*3/mm3      Immature Grans, Absolute 0.03 10*3/mm3     Basic Metabolic Panel [[id number]]  (Normal) Collected:  06/19/17 0251    Specimen:  Blood Updated:  06/19/17 0335     Glucose 94 mg/dL      BUN 12 mg/dL      Creatinine 0.96 mg/dL      Sodium 143 mmol/L      Potassium 3.9 mmol/L      Chloride 105 mmol/L      CO2 27.0 mmol/L      Calcium 9.3 mg/dL      eGFR Non African Amer 79 mL/min/1.73      BUN/Creatinine Ratio 12.5     Anion Gap 11.0 mmol/L     Lipid Panel [[id number]]  (Abnormal) Collected:  06/19/17 0251    Specimen:  Blood Updated:  06/19/17 0346     Total Cholesterol 101 (L) mg/dL      Triglycerides 79 mg/dL      HDL Cholesterol 32 (L) mg/dL      LDL Cholesterol  45 mg/dL      LDL/HDL Ratio 1.66            Condition on Discharge:    Stable    Physical Exam on Discharge:  /85  Pulse 59  Temp 97.4 °F (36.3 °C) (Temporal Artery )   Resp 18  Ht 70\" (177.8 cm)  Wt 239 lb 9.6 oz (109 kg)  SpO2 98%  BMI " 34.38 kg/m2  Physical Exam  Constitutional: He is oriented to person, place, and time. He appears well-developed and well-nourished. No distress.   HENT:   Head: Normocephalic and atraumatic.   Right Ear: External ear normal.   Left Ear: External ear normal.   Nose: Nose normal.   Mouth/Throat: Oropharynx is clear and moist.   Eyes: Conjunctivae and EOM are normal. Pupils are equal, round, and reactive to light. No scleral icterus.   Neck: Normal range of motion. Neck supple. No JVD present. No tracheal deviation present. No thyromegaly present.   Cardiovascular: Normal rate, regular rhythm, normal heart sounds and intact distal pulses.   No murmur heard.  Pulmonary/Chest: Effort normal and breath sounds normal. No respiratory distress. He has no wheezes.   Abdominal: Soft. Bowel sounds are normal. He exhibits no distension. There is no tenderness.   Musculoskeletal: Normal range of motion. He exhibits tenderness (in the paraspinal area of the thoracic spine). He exhibits no edema.   Neurological: He is alert and oriented to person, place, and time. He has normal reflexes. No cranial nerve deficit. Coordination normal.   Skin: Skin is warm and dry. No rash noted. No erythema.   Psychiatric: He has a normal mood and affect. His behavior is normal. Judgment and thought content normal.     Discharge Disposition:  Home or Self Care    Discharge Medications:   Rubén Olivares   Home Medication Instructions ELODIA:[id number]    Printed on:06/19/17 1043   Medication Information                      ALPRAZolam (XANAX) 1 MG tablet  Take 1 mg by mouth 3 (Three) Times a Day As Needed for Anxiety.             aspirin 81 MG EC tablet  Take 81 mg by mouth Daily.             atorvastatin (LIPITOR) 40 MG tablet  Take 40 mg by mouth Every Night.             cholecalciferol (VITAMIN D3) 1000 UNITS tablet  Take 1,000 Units by mouth 2 (Two) Times a Day.             lansoprazole (PREVACID) 30 MG capsule  Take 30 mg by mouth Daily.              mesalamine (DELZICOL) 400 MG capsule delayed-release delayed release capsule  Take 800 mg by mouth Every 12 (Twelve) Hours.             metoprolol succinate XL (TOPROL-XL) 50 MG 24 hr tablet  Take 50 mg by mouth Daily.             Multiple Vitamins-Minerals (MULTIVITAMIN ADULT PO)  Take 1 tablet by mouth Every Night.             ticagrelor (BRILINTA) 90 MG tablet tablet  Take 90 mg by mouth Every 12 (Twelve) Hours.             vitamin C (ASCORBIC ACID) 500 MG tablet  Take 500 mg by mouth Every Night.                 Discharge Diet:   Diet Instructions     Diet: Cardiac; Thin Liquids, No Restrictions       Discharge Diet:  Cardiac   Fluid Consistency:  Thin Liquids, No Restrictions               Heart Healthy Diet                    Discharge Care Plan / Instructions:   Discharge home    Activity at Discharge:   Activity Instructions     Activity as Tolerated               Additional Activity Instructions:      Gradually Resume Daily Activities                  Follow-up Appointments:  Follow-up with family physician in one week  Follow-up with his cardiologist in 1 month    Test Results Pending at Discharge:   None     Charlie Hernandez DO  06/19/17  10:41 AM    Time: Discharge Less than 30 min    Please note that portions of this note may have been completed with a voice recognition program. Efforts were made to edit the dictations, but occasionally words are mistranscribed.

## 2017-06-20 NOTE — PAYOR COMM NOTE
"FROM: MADDI ALTAMIRANO  PHONE: [phone]  FAX: [phone]    [id number]    Rubén Olivares (66 y.o. Male)     Date of Birth Social Security Number Address Home Phone MRN    [id number]  [id number] [place] [address] [id number]    [facility] Marital Status          None        Admission Date Admission Type Admitting Provider Attending Provider Department, Room/Bed    6/18/17 Emergency Charlie Hernandez DO  [facility] 4B, 434/1    Discharge Date Discharge Disposition Discharge Destination        6/19/2017 Home or Self Care             Attending Provider: (none)    Allergies:  Amoxicillin, Ciprofloxacin, Cozaar [Losartan Potassium], Levaquin [Levofloxacin], Penicillins    Isolation:  None   Infection:  None   Code Status:  Prior    Ht:  70\" (177.8 cm)   Wt:  239 lb 9.6 oz (109 kg)    Admission Cmt:  None   Principal Problem:  Angina pectoris [I20.9]                 Active Insurance as of 6/18/2017     Primary Coverage     Payor Plan Insurance Group Employer/Plan Group    [employer] [id number]     Payor Address Payor Phone Number Effective From Effective To    [address]  6/17/2017     [place] [address]       Subscriber Name Subscriber Birth Date Member ID       RONALDO OLIVARES 7/26/1952 [id number]                 Emergency Contacts      (Rel.) Home Phone Work Phone Mobile Phone    Neha Olivares (Spouse) [phone] -- --               Discharge Summary      Charlie Hernandez DO at 6/19/2017 10:41 AM              [facility]  DISCHARGE SUMMARY       Date of Admission: 6/18/2017  Date of Discharge:  6/19/2017  Primary Care Physician: No Ortega PA-C    Discharge Diagnoses:  Patient Active Problem List   Diagnosis   • Hypertension   • Hyperlipidemia   • Disorder of phrenic nerve   • Crohn's disease   • Precordial pain   • Angina pectoris         Presenting " Problem/History of Present Illness:  Unstable angina pectoris [I20.0]     Chief Complaint on Day of Discharge:   None    History of Present Illness on Day of Discharge:   The patient has completed his stress test and it was interpreted as low risk of ischemia.  Serial troponins were negative.    Hospital Course   This 66-year-old white male is admitted with a chief complaint of chest pain with associated back pain, jaw pain and left arm pain. The patient states that his chest discomfort began at noon today. The patient also notes that he had identical pain in his chest, back, jaw and arm when he had an MI 9 months ago. The patient's chest pain abated out treatment while he was in the emergency room. The patient did not receive nitroglycerin but did receive aspirin while in the emergency room.   PLAN:   Admit to the cardiac unit  Cardiac monitoring  Serial troponins  Cardiology consultation  Stress echo tomorrow    The patient has had no further discomfort since admission and serial troponins are negative.  Stress echo shows no evidence for myocardial ischemia.  Cardiology has evaluated the patient and agrees that he is appropriate for discharge with follow-up to his family physician and personal cardiologist in [facility].    Procedures Performed:   Stress echo  Interpretation Summary   · Normal baseline ECG noted.  · The patient reported shortness of breath during the stress test. The patient experienced no angina during the stress test.  · There was no ST segment deviation noted during stress.  · Segment augmentation had a normal response to stress.  · Normal stress echo with no significant echocardiographic evidence for myocardial ischemia.     Consults:   Cardiology    Pertinent Test Results:   Lab Results (last 7 days)     Procedure Component Value Units Date/Time    POC Troponin, Rapid [[id number]]  (Normal) Collected:  06/18/17 1615    Specimen:  Blood Updated:  06/18/17 1630     Troponin I 0.00 ng/mL        Serial Number: [id number]    : [id number]       Comprehensive Metabolic Panel [[id number]]  (Abnormal) Collected:  06/18/17 1606    Specimen:  Blood Updated:  06/18/17 1702     Glucose 116 (H) mg/dL      BUN 13 mg/dL      Creatinine 1.08 mg/dL      Sodium 142 mmol/L      Potassium 3.9 mmol/L      Chloride 103 mmol/L      CO2 28.0 mmol/L      Calcium 9.6 mg/dL      Total Protein 7.8 g/dL      Albumin 4.50 g/dL      ALT (SGPT) 55 (H) U/L      AST (SGOT) 41 U/L      Alkaline Phosphatase 94 U/L      Total Bilirubin 0.8 mg/dL      eGFR Non African Amer 70 mL/min/1.73      Globulin 3.3 gm/dL      A/G Ratio 1.4 g/dL      BUN/Creatinine Ratio 12.0     Anion Gap 11.0 mmol/L     CBC Auto Differential [[id number]]  (Abnormal) Collected:  06/18/17 1606    Specimen:  Blood Updated:  06/18/17 1711     WBC 9.45 10*3/mm3      RBC 4.66 (L) 10*6/mm3      Hemoglobin 14.8 g/dL      Hematocrit 43.3 %      MCV 92.9 fL      MCH 31.8 pg      MCHC 34.2 g/dL      RDW 13.4 %      RDW-SD 45.4 fl      MPV 10.8 fL      Platelets 283 10*3/mm3      Neutrophil % 61.8 %      Lymphocyte % 21.9 %      Monocyte % 10.6 %      Eosinophil % 5.0 (H) %      Basophil % 0.5 %      Immature Grans % 0.2 %      Neutrophils, Absolute 5.84 10*3/mm3      Lymphocytes, Absolute 2.07 10*3/mm3      Monocytes, Absolute 1.00 10*3/mm3      Eosinophils, Absolute 0.47 10*3/mm3      Basophils, Absolute 0.05 10*3/mm3      Immature Grans, Absolute 0.02 10*3/mm3     Protime-INR [[id number]]  (Normal) Collected:  06/18/17 1605    Specimen:  Blood from Arm, Left Updated:  06/18/17 1715     Protime 12.7 Seconds      INR 0.93    Troponin [[id number]]  (Normal) Collected:  06/18/17 1956    Specimen:  Blood Updated:  06/18/17 2031     Troponin I <0.012 ng/mL     Troponin [[id number]]  (Normal) Collected:  06/18/17 2220    Specimen:  Blood Updated:  06/18/17 2303     Troponin I <0.012 ng/mL     CBC Auto Differential [[id number]]  (Abnormal) Collected:  06/19/17 0251    Specimen:   "Blood Updated:  06/19/17 0309     WBC 10.05 10*3/mm3      RBC 4.51 (L) 10*6/mm3      Hemoglobin 14.0 g/dL      Hematocrit 41.7 %      MCV 92.5 fL      MCH 31.0 pg      MCHC 33.6 g/dL      RDW 13.3 %      RDW-SD 45.0 fl      MPV 10.1 fL      Platelets 246 10*3/mm3      Neutrophil % 57.3 %      Lymphocyte % 25.9 %      Monocyte % 10.6 %      Eosinophil % 5.4 (H) %      Basophil % 0.5 %      Immature Grans % 0.3 %      Neutrophils, Absolute 5.76 10*3/mm3      Lymphocytes, Absolute 2.60 10*3/mm3      Monocytes, Absolute 1.07 10*3/mm3      Eosinophils, Absolute 0.54 10*3/mm3      Basophils, Absolute 0.05 10*3/mm3      Immature Grans, Absolute 0.03 10*3/mm3     Basic Metabolic Panel [[id number]]  (Normal) Collected:  06/19/17 0251    Specimen:  Blood Updated:  06/19/17 0335     Glucose 94 mg/dL      BUN 12 mg/dL      Creatinine 0.96 mg/dL      Sodium 143 mmol/L      Potassium 3.9 mmol/L      Chloride 105 mmol/L      CO2 27.0 mmol/L      Calcium 9.3 mg/dL      eGFR Non African Amer 79 mL/min/1.73      BUN/Creatinine Ratio 12.5     Anion Gap 11.0 mmol/L     Lipid Panel [[id number]]  (Abnormal) Collected:  06/19/17 0251    Specimen:  Blood Updated:  06/19/17 0346     Total Cholesterol 101 (L) mg/dL      Triglycerides 79 mg/dL      HDL Cholesterol 32 (L) mg/dL      LDL Cholesterol  45 mg/dL      LDL/HDL Ratio 1.66            Condition on Discharge:    Stable    Physical Exam on Discharge:  /85  Pulse 59  Temp 97.4 °F (36.3 °C) (Temporal Artery )   Resp 18  Ht 70\" (177.8 cm)  Wt 239 lb 9.6 oz (109 kg)  SpO2 98%  BMI 34.38 kg/m2  Physical Exam  Constitutional: He is oriented to person, place, and time. He appears well-developed and well-nourished. No distress.   HENT:   Head: Normocephalic and atraumatic.   Right Ear: External ear normal.   Left Ear: External ear normal.   Nose: Nose normal.   Mouth/Throat: Oropharynx is clear and moist.   Eyes: Conjunctivae and EOM are normal. Pupils are equal, round, and reactive " to light. No scleral icterus.   Neck: Normal range of motion. Neck supple. No JVD present. No tracheal deviation present. No thyromegaly present.   Cardiovascular: Normal rate, regular rhythm, normal heart sounds and intact distal pulses.   No murmur heard.  Pulmonary/Chest: Effort normal and breath sounds normal. No respiratory distress. He has no wheezes.   Abdominal: Soft. Bowel sounds are normal. He exhibits no distension. There is no tenderness.   Musculoskeletal: Normal range of motion. He exhibits tenderness (in the paraspinal area of the thoracic spine). He exhibits no edema.   Neurological: He is alert and oriented to person, place, and time. He has normal reflexes. No cranial nerve deficit. Coordination normal.   Skin: Skin is warm and dry. No rash noted. No erythema.   Psychiatric: He has a normal mood and affect. His behavior is normal. Judgment and thought content normal.     Discharge Disposition:  Home or Self Care    Discharge Medications:   Rubén Olivares   Home Medication Instructions ELODIA:[id number]    Printed on:06/19/17 6503   Medication Information                      ALPRAZolam (XANAX) 1 MG tablet  Take 1 mg by mouth 3 (Three) Times a Day As Needed for Anxiety.             aspirin 81 MG EC tablet  Take 81 mg by mouth Daily.             atorvastatin (LIPITOR) 40 MG tablet  Take 40 mg by mouth Every Night.             cholecalciferol (VITAMIN D3) 1000 UNITS tablet  Take 1,000 Units by mouth 2 (Two) Times a Day.             lansoprazole (PREVACID) 30 MG capsule  Take 30 mg by mouth Daily.             mesalamine (DELZICOL) 400 MG capsule delayed-release delayed release capsule  Take 800 mg by mouth Every 12 (Twelve) Hours.             metoprolol succinate XL (TOPROL-XL) 50 MG 24 hr tablet  Take 50 mg by mouth Daily.             Multiple Vitamins-Minerals (MULTIVITAMIN ADULT PO)  Take 1 tablet by mouth Every Night.             ticagrelor (BRILINTA) 90 MG tablet tablet  Take 90 mg by mouth Every 12  (Twelve) Hours.             vitamin C (ASCORBIC ACID) 500 MG tablet  Take 500 mg by mouth Every Night.                 Discharge Diet:   Diet Instructions     Diet: Cardiac; Thin Liquids, No Restrictions       Discharge Diet:  Cardiac   Fluid Consistency:  Thin Liquids, No Restrictions               Heart Healthy Diet                    Discharge Care Plan / Instructions:   Discharge home    Activity at Discharge:   Activity Instructions     Activity as Tolerated               Additional Activity Instructions:      Gradually Resume Daily Activities                  Follow-up Appointments:  Follow-up with family physician in one week  Follow-up with his cardiologist in 1 month    Test Results Pending at Discharge:   None     Charlie Hernandez DO  06/19/17  10:41 AM    Time: Discharge Less than 30 min    Please note that portions of this note may have been completed with a voice recognition program. Efforts were made to edit the dictations, but occasionally words are mistranscribed.             Electronically signed by Charlie Hernandez DO at 6/19/2017 10:48 AM

## 2024-05-13 ENCOUNTER — OFFICE (OUTPATIENT)
Dept: URBAN - METROPOLITAN AREA CLINIC 9 | Facility: CLINIC | Age: 74
End: 2024-05-13
Payer: COMMERCIAL

## 2024-05-13 VITALS
OXYGEN SATURATION: 97 % | WEIGHT: 278 LBS | HEART RATE: 66 BPM | SYSTOLIC BLOOD PRESSURE: 144 MMHG | HEIGHT: 71 IN | DIASTOLIC BLOOD PRESSURE: 81 MMHG

## 2024-05-13 DIAGNOSIS — K50.90 CROHN'S DISEASE, UNSPECIFIED, WITHOUT COMPLICATIONS: ICD-10-CM

## 2024-05-13 DIAGNOSIS — Z87.898 PERSONAL HISTORY OF OTHER SPECIFIED CONDITIONS: ICD-10-CM

## 2024-05-13 DIAGNOSIS — Z80.0 FAMILY HISTORY OF MALIGNANT NEOPLASM OF DIGESTIVE ORGANS: ICD-10-CM

## 2024-05-13 PROCEDURE — 99204 OFFICE O/P NEW MOD 45 MIN: CPT | Performed by: NURSE PRACTITIONER

## 2024-05-13 RX ORDER — POLYETHYLENE GLYCOL 3350, SODIUM SULFATE ANHYDROUS, SODIUM BICARBONATE, SODIUM CHLORIDE, POTASSIUM CHLORIDE 236; 22.74; 6.74; 5.86; 2.97 G/4L; G/4L; G/4L; G/4L; G/4L
POWDER, FOR SOLUTION ORAL
Qty: 4000 | Refills: 0 | Status: ACTIVE
Start: 2024-05-13